# Patient Record
Sex: FEMALE | Race: WHITE | Employment: FULL TIME | URBAN - METROPOLITAN AREA
[De-identification: names, ages, dates, MRNs, and addresses within clinical notes are randomized per-mention and may not be internally consistent; named-entity substitution may affect disease eponyms.]

---

## 2017-08-03 PROBLEM — M54.50 CHRONIC BILATERAL LOW BACK PAIN WITHOUT SCIATICA: Status: ACTIVE | Noted: 2017-08-03

## 2017-08-03 PROBLEM — G89.29 CHRONIC BILATERAL LOW BACK PAIN WITHOUT SCIATICA: Status: ACTIVE | Noted: 2017-08-03

## 2017-08-03 PROBLEM — R07.81 RIB PAIN: Status: ACTIVE | Noted: 2017-08-03

## 2017-08-03 PROBLEM — D17.9 LIPOMA, UNSPECIFIED SITE: Status: ACTIVE | Noted: 2017-08-03

## 2017-08-03 PROCEDURE — 86803 HEPATITIS C AB TEST: CPT | Performed by: INTERNAL MEDICINE

## 2017-08-08 PROCEDURE — 88305 TISSUE EXAM BY PATHOLOGIST: CPT | Performed by: INTERNAL MEDICINE

## 2018-08-10 PROBLEM — F52.9 SEXUAL AROUSAL DISORDER: Status: ACTIVE | Noted: 2018-08-10

## 2018-11-28 PROCEDURE — 86706 HEP B SURFACE ANTIBODY: CPT | Performed by: INTERNAL MEDICINE

## 2018-11-28 PROCEDURE — 86780 TREPONEMA PALLIDUM: CPT | Performed by: INTERNAL MEDICINE

## 2018-11-28 PROCEDURE — 87389 HIV-1 AG W/HIV-1&-2 AB AG IA: CPT | Performed by: INTERNAL MEDICINE

## 2019-07-24 PROCEDURE — 88305 TISSUE EXAM BY PATHOLOGIST: CPT | Performed by: RADIOLOGY

## 2020-04-29 ENCOUNTER — APPOINTMENT (OUTPATIENT)
Dept: MRI IMAGING | Facility: HOSPITAL | Age: 55
End: 2020-04-29
Attending: EMERGENCY MEDICINE
Payer: COMMERCIAL

## 2020-04-29 ENCOUNTER — HOSPITAL ENCOUNTER (EMERGENCY)
Facility: HOSPITAL | Age: 55
Discharge: HOME | End: 2020-04-29
Attending: EMERGENCY MEDICINE
Payer: COMMERCIAL

## 2020-04-29 ENCOUNTER — APPOINTMENT (OUTPATIENT)
Dept: CT IMAGING | Facility: HOSPITAL | Age: 55
End: 2020-04-29
Attending: EMERGENCY MEDICINE
Payer: COMMERCIAL

## 2020-04-29 VITALS
OXYGEN SATURATION: 95 % | RESPIRATION RATE: 18 BRPM | TEMPERATURE: 98 F | SYSTOLIC BLOOD PRESSURE: 126 MMHG | HEART RATE: 77 BPM | DIASTOLIC BLOOD PRESSURE: 91 MMHG

## 2020-04-29 DIAGNOSIS — R25.1 OCCASIONAL TREMORS: ICD-10-CM

## 2020-04-29 DIAGNOSIS — R41.82 ALTERED MENTAL STATUS, UNSPECIFIED ALTERED MENTAL STATUS TYPE: ICD-10-CM

## 2020-04-29 DIAGNOSIS — R51.9 FREQUENT HEADACHES: Primary | ICD-10-CM

## 2020-04-29 PROCEDURE — 99285 EMERGENCY DEPT VISIT HI MDM: CPT

## 2020-04-29 PROCEDURE — 87086 URINE CULTURE/COLONY COUNT: CPT | Performed by: EMERGENCY MEDICINE

## 2020-04-29 PROCEDURE — 96374 THER/PROPH/DIAG INJ IV PUSH: CPT

## 2020-04-29 PROCEDURE — 70546 MR ANGIOGRAPH HEAD W/O&W/DYE: CPT | Performed by: EMERGENCY MEDICINE

## 2020-04-29 PROCEDURE — A9575 INJ GADOTERATE MEGLUMI 0.1ML: HCPCS | Performed by: EMERGENCY MEDICINE

## 2020-04-29 PROCEDURE — 84443 ASSAY THYROID STIM HORMONE: CPT | Performed by: EMERGENCY MEDICINE

## 2020-04-29 PROCEDURE — 70553 MRI BRAIN STEM W/O & W/DYE: CPT | Performed by: EMERGENCY MEDICINE

## 2020-04-29 PROCEDURE — 85025 COMPLETE CBC W/AUTO DIFF WBC: CPT | Performed by: EMERGENCY MEDICINE

## 2020-04-29 PROCEDURE — 82550 ASSAY OF CK (CPK): CPT | Performed by: EMERGENCY MEDICINE

## 2020-04-29 PROCEDURE — 70549 MR ANGIOGRAPH NECK W/O&W/DYE: CPT | Performed by: EMERGENCY MEDICINE

## 2020-04-29 PROCEDURE — 80053 COMPREHEN METABOLIC PANEL: CPT | Performed by: EMERGENCY MEDICINE

## 2020-04-29 PROCEDURE — 93010 ELECTROCARDIOGRAM REPORT: CPT

## 2020-04-29 PROCEDURE — 70450 CT HEAD/BRAIN W/O DYE: CPT | Performed by: EMERGENCY MEDICINE

## 2020-04-29 PROCEDURE — 93005 ELECTROCARDIOGRAM TRACING: CPT

## 2020-04-29 PROCEDURE — 81001 URINALYSIS AUTO W/SCOPE: CPT | Performed by: EMERGENCY MEDICINE

## 2020-04-29 RX ORDER — LORAZEPAM 2 MG/ML
1 INJECTION INTRAMUSCULAR ONCE
Status: COMPLETED | OUTPATIENT
Start: 2020-04-29 | End: 2020-04-29

## 2020-04-29 NOTE — ED NOTES
attemtped to discharge pt without success. Pt and son asking why pt was not tested for covid in ER.  Pt is supposed to go to [facility] for antibody testing as she has had the symptoms for 2months per MD. 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

## 2020-04-29 NOTE — ED PROVIDER NOTES
Patient Seen in: [facility] Emergency Department      History   Patient presents with:  Numbness Weakness    Stated Complaint:     HPI    50-year-old female comes to the emergency department for evaluation of a 2-month history of intermittent migrat 8/8/2017    Performed by Roxanne Limon MD at [facility]   • HYSTERECTOMY  2010    fibroids   • REDUCTION LEFT      20 yrs ago   • REDUCTION OF LARGE BREAST  2000    for fibrocystic disease   • REDUCTION RIGHT                      Social Hi following components:       Result Value    Glucose 106 (*)     BUN 19 (*)     AST 13 (*)     All other components within normal limits   URINALYSIS WITH CULTURE REFLEX - Abnormal; Notable for the following components:    Leukocyte Esterase Urine Large (*) stenosis, occlusion, dissection, or aneurysm is seen within the major intracranial or cervical arterial vasculature.            Dictated by: Shayne Aldana MD on 4/29/2020 at 10:04 AM       Finalized by: Shayne Aldana MD on 4/29/2020 at 10:19 AM

## 2020-04-29 NOTE — ED INITIAL ASSESSMENT (HPI)
Pt states she has \"severe trembling in her upper extremities\", an eye infection, coughing at least twice a day.  Pt also admits to having \"chronic anxiety\"

## 2020-05-21 PROBLEM — R07.81 RIB PAIN: Status: RESOLVED | Noted: 2017-08-03 | Resolved: 2020-05-21

## 2020-05-21 PROBLEM — R41.89 COGNITIVE AND BEHAVIORAL CHANGES: Status: ACTIVE | Noted: 2020-05-21

## 2020-05-21 PROBLEM — R46.89 COGNITIVE AND BEHAVIORAL CHANGES: Status: ACTIVE | Noted: 2020-05-21

## 2020-05-21 PROBLEM — R25.1 TREMOR: Status: ACTIVE | Noted: 2020-05-21
